# Patient Record
Sex: FEMALE | Race: WHITE | NOT HISPANIC OR LATINO | Employment: OTHER | URBAN - METROPOLITAN AREA
[De-identification: names, ages, dates, MRNs, and addresses within clinical notes are randomized per-mention and may not be internally consistent; named-entity substitution may affect disease eponyms.]

---

## 2017-03-21 ENCOUNTER — APPOINTMENT (OUTPATIENT)
Dept: MAMMOGRAPHY | Facility: CLINIC | Age: 77
End: 2017-03-21

## 2017-03-21 ENCOUNTER — PROCEDURE VISIT (OUTPATIENT)
Dept: OBSTETRICS AND GYNECOLOGY | Facility: CLINIC | Age: 77
End: 2017-03-21

## 2017-03-21 ENCOUNTER — OFFICE VISIT (OUTPATIENT)
Dept: OBSTETRICS AND GYNECOLOGY | Facility: CLINIC | Age: 77
End: 2017-03-21

## 2017-03-21 VITALS
WEIGHT: 136.6 LBS | HEIGHT: 64 IN | SYSTOLIC BLOOD PRESSURE: 110 MMHG | BODY MASS INDEX: 23.32 KG/M2 | DIASTOLIC BLOOD PRESSURE: 60 MMHG

## 2017-03-21 DIAGNOSIS — Z79.890 HORMONE REPLACEMENT THERAPY: ICD-10-CM

## 2017-03-21 DIAGNOSIS — Z13.820 SCREENING FOR OSTEOPOROSIS: Primary | ICD-10-CM

## 2017-03-21 DIAGNOSIS — Z78.0 POSTMENOPAUSAL: ICD-10-CM

## 2017-03-21 DIAGNOSIS — Z12.31 VISIT FOR SCREENING MAMMOGRAM: ICD-10-CM

## 2017-03-21 DIAGNOSIS — Z01.419 WELL WOMAN EXAM WITH ROUTINE GYNECOLOGICAL EXAM: Primary | ICD-10-CM

## 2017-03-21 PROCEDURE — 77080 DXA BONE DENSITY AXIAL: CPT | Performed by: OBSTETRICS & GYNECOLOGY

## 2017-03-21 PROCEDURE — G0101 CA SCREEN;PELVIC/BREAST EXAM: HCPCS | Performed by: OBSTETRICS & GYNECOLOGY

## 2017-03-21 PROCEDURE — G0202 SCR MAMMO BI INCL CAD: HCPCS | Performed by: OBSTETRICS & GYNECOLOGY

## 2017-03-21 RX ORDER — LEVOTHYROXINE SODIUM 0.1 MG/1
TABLET ORAL
Refills: 3 | COMMUNITY
Start: 2017-02-08

## 2017-03-21 RX ORDER — ESTRADIOL 0.1 MG/G
CREAM VAGINAL
Refills: 0 | COMMUNITY
Start: 2017-02-01 | End: 2021-07-15

## 2017-03-21 RX ORDER — ESTRADIOL 1 MG/1
1 TABLET ORAL DAILY
Qty: 90 TABLET | Refills: 3 | Status: SHIPPED | OUTPATIENT
Start: 2017-03-21 | End: 2020-07-09 | Stop reason: SDUPTHER

## 2017-03-21 NOTE — PROGRESS NOTES
Subjective   Lavinia Fatima is a 76 y.o. female is here today as a self referral for annual.    History of Present Illness-here today for annual exam and checkup.   She would also like to restart her estradiol 1 mg because it seemed to help with her knee pain.    The following portions of the patient's history were reviewed and updated as appropriate: allergies, current medications, past family history, past medical history, past social history, past surgical history and problem list.    Review of Systems   Constitutional: Negative.    HENT: Negative.    Eyes: Negative.    Respiratory: Negative.    Cardiovascular: Negative.    Gastrointestinal: Negative.    Endocrine: Negative.    Genitourinary: Negative.    Musculoskeletal: Negative.    Skin: Negative.    Allergic/Immunologic: Negative.    Neurological: Negative.    Hematological: Negative.    Psychiatric/Behavioral: Negative.        Objective   Physical Exam   Constitutional: She is oriented to person, place, and time. She appears well-developed and well-nourished.   HENT:   Head: Normocephalic and atraumatic.   Nose: Nose normal.   Eyes: Conjunctivae and EOM are normal. Pupils are equal, round, and reactive to light.   Neck: Normal range of motion. Neck supple.   Cardiovascular: Normal rate, regular rhythm and normal heart sounds.    Pulmonary/Chest: Effort normal and breath sounds normal. Right breast exhibits no inverted nipple, no mass, no nipple discharge, no skin change and no tenderness. Left breast exhibits no inverted nipple, no mass, no nipple discharge and no skin change. Breasts are symmetrical. There is no breast swelling.   Abdominal: Soft. Hernia confirmed negative in the right inguinal area and confirmed negative in the left inguinal area.   Genitourinary: Rectum normal. No breast tenderness, discharge or bleeding. Pelvic exam was performed with patient supine. No labial fusion. There is no rash, tenderness, lesion or injury on the right labia.  There is no rash, tenderness, lesion or injury on the left labia. Right adnexum displays no mass, no tenderness and no fullness. Left adnexum displays no mass, no tenderness and no fullness. No erythema or bleeding in the vagina. No foreign body in the vagina. No signs of injury around the vagina. No vaginal discharge found.   Neurological: She is alert and oriented to person, place, and time. She has normal reflexes.   Skin: Skin is warm and dry.   Psychiatric: She has a normal mood and affect. Her behavior is normal. Judgment and thought content normal.         Assessment/Plan   Problems Addressed this Visit     None      Visit Diagnoses     Well woman exam with routine gynecological exam    -  Primary    Hormone replacement therapy            Mammogram and DEXA scan done today.  DEXA scan in stable.  Current with colonoscopy.  Prescription sent to pharmacy for estradiol 1 mg daily.

## 2018-03-08 ENCOUNTER — TRANSCRIBE ORDERS (OUTPATIENT)
Dept: ADMINISTRATIVE | Facility: HOSPITAL | Age: 78
End: 2018-03-08

## 2018-03-08 DIAGNOSIS — Z12.31 VISIT FOR SCREENING MAMMOGRAM: Primary | ICD-10-CM

## 2018-03-26 ENCOUNTER — HOSPITAL ENCOUNTER (OUTPATIENT)
Dept: MAMMOGRAPHY | Facility: HOSPITAL | Age: 78
Discharge: HOME | End: 2018-03-26
Admitting: OBSTETRICS & GYNECOLOGY

## 2018-03-26 DIAGNOSIS — Z12.31 VISIT FOR SCREENING MAMMOGRAM: ICD-10-CM

## 2018-03-26 PROCEDURE — 77067 SCR MAMMO BI INCL CAD: CPT

## 2019-05-01 ENCOUNTER — PROCEDURE VISIT (OUTPATIENT)
Dept: OBSTETRICS AND GYNECOLOGY | Facility: CLINIC | Age: 79
End: 2019-05-01

## 2019-05-01 ENCOUNTER — OFFICE VISIT (OUTPATIENT)
Dept: OBSTETRICS AND GYNECOLOGY | Facility: CLINIC | Age: 79
End: 2019-05-01

## 2019-05-01 ENCOUNTER — APPOINTMENT (OUTPATIENT)
Dept: WOMENS IMAGING | Facility: HOSPITAL | Age: 79
End: 2019-05-01

## 2019-05-01 VITALS
WEIGHT: 132 LBS | BODY MASS INDEX: 21.99 KG/M2 | HEIGHT: 65 IN | SYSTOLIC BLOOD PRESSURE: 130 MMHG | DIASTOLIC BLOOD PRESSURE: 80 MMHG

## 2019-05-01 DIAGNOSIS — Z12.31 VISIT FOR SCREENING MAMMOGRAM: Primary | ICD-10-CM

## 2019-05-01 DIAGNOSIS — Z78.0 MENOPAUSE: ICD-10-CM

## 2019-05-01 DIAGNOSIS — Z01.419 ENCOUNTER FOR GYNECOLOGICAL EXAMINATION WITHOUT ABNORMAL FINDING: Primary | ICD-10-CM

## 2019-05-01 DIAGNOSIS — Z78.0 POST-MENOPAUSAL: ICD-10-CM

## 2019-05-01 DIAGNOSIS — Z00.00 PREVENTATIVE HEALTH CARE: Primary | ICD-10-CM

## 2019-05-01 DIAGNOSIS — Z82.62 FAMILY HISTORY OF OSTEOPOROSIS: ICD-10-CM

## 2019-05-01 DIAGNOSIS — N39.46 MIXED STRESS AND URGE URINARY INCONTINENCE: ICD-10-CM

## 2019-05-01 PROCEDURE — 77067 SCR MAMMO BI INCL CAD: CPT | Performed by: OBSTETRICS & GYNECOLOGY

## 2019-05-01 PROCEDURE — G0101 CA SCREEN;PELVIC/BREAST EXAM: HCPCS | Performed by: OBSTETRICS & GYNECOLOGY

## 2019-05-01 PROCEDURE — 77067 SCR MAMMO BI INCL CAD: CPT | Performed by: RADIOLOGY

## 2019-05-01 PROCEDURE — 77080 DXA BONE DENSITY AXIAL: CPT | Performed by: OBSTETRICS & GYNECOLOGY

## 2019-05-01 RX ORDER — METOPROLOL SUCCINATE 50 MG/1
50 TABLET, EXTENDED RELEASE ORAL DAILY
Refills: 3 | COMMUNITY
Start: 2019-04-16

## 2019-05-01 NOTE — PROGRESS NOTES
Subjective    Lavinia Fatima is a 78 y.o. female for annual gyn exam    History of Present Illness   78-year-old postmenopausal white female presents for routine gynecologic exam.  She is status post hysterectomy with bladder repair 3 years ago.  She received a mammogram in the office today.  She has had recurring issues with bladder leakage and tried a pessary following the surgery.  She is seeing Dr. Caldwell the gynecologic urologist.  There is no family history of breast cancer.  Her father had a history of colon cancer.  The patient is current on her screening colonoscopies.  She received a DEXA scan today in the office which was normal.  She has no complaints.    The following portions of the patient's history were reviewed and updated as appropriate: allergies, current medications, past family history, past medical history, past social history, past surgical history and problem list.      Review of Systems   Constitutional: Negative for activity change, appetite change, fatigue and unexpected weight change.   HENT: Negative.    Eyes: Negative.    Respiratory: Negative.    Cardiovascular: Negative.    Gastrointestinal: Negative for abdominal distention, abdominal pain, anal bleeding, constipation, diarrhea, nausea and vomiting.   Endocrine: Negative for cold intolerance, heat intolerance, polydipsia, polyphagia and polyuria.   Genitourinary: Positive for enuresis. Negative for difficulty urinating, dysuria, flank pain, frequency, hematuria, pelvic pain, urgency, vaginal bleeding and vaginal discharge.   Musculoskeletal: Negative.    Skin: Negative.    Allergic/Immunologic: Negative.    Neurological: Negative.    Hematological: Negative.    Psychiatric/Behavioral: Negative.            Physical Exam   Constitutional: She is oriented to person, place, and time. Vital signs are normal. She appears well-developed and well-nourished. She is cooperative.   HENT:   Head: Normocephalic.   Eyes: Conjunctivae are normal.  Pupils are equal, round, and reactive to light.   Neck: Normal range of motion. Neck supple. No tracheal deviation present. No thyromegaly present.   Cardiovascular: Normal rate, regular rhythm and normal heart sounds. Exam reveals no gallop and no friction rub.   No murmur heard.  Pulmonary/Chest: Effort normal and breath sounds normal. No respiratory distress. She has no wheezes. Right breast exhibits no inverted nipple, no mass, no nipple discharge, no skin change and no tenderness. Left breast exhibits no inverted nipple, no mass, no nipple discharge, no skin change and no tenderness. Breasts are symmetrical. There is no breast swelling.   Abdominal: Soft. Bowel sounds are normal. She exhibits no distension, no ascites and no mass. There is no hepatosplenomegaly. There is no tenderness. There is no rebound, no guarding and no CVA tenderness. No hernia. Hernia confirmed negative in the right inguinal area and confirmed negative in the left inguinal area.   Genitourinary: Rectal exam shows no fissure, no mass, no tenderness and anal tone normal. Pelvic exam was performed with patient supine. No labial fusion. There is no rash, tenderness, lesion or injury on the right labia. There is no rash, tenderness, lesion or injury on the left labia. Right adnexum displays no mass, no tenderness and no fullness. Left adnexum displays no mass, no tenderness and no fullness. No erythema, tenderness or bleeding in the vagina. No foreign body in the vagina. No signs of injury around the vagina. No vaginal discharge found.   Musculoskeletal: Normal range of motion. She exhibits no edema or deformity.   Lymphadenopathy:     She has no cervical adenopathy.        Right: No inguinal adenopathy present.        Left: No inguinal adenopathy present.   Neurological: She is alert and oriented to person, place, and time. She has normal reflexes. No cranial nerve deficit. Coordination normal.   Skin: Skin is warm and dry. No rash noted. No  erythema.   Psychiatric: She has a normal mood and affect. Her behavior is normal.         Lavinia was seen today for gynecologic exam.    Diagnoses and all orders for this visit:    Encounter for gynecological examination without abnormal finding  -     Pap IG, Rfx HPV ASCU    Menopause    Mixed stress and urge urinary incontinence    All in all, the patient is doing well.  We discussed her incontinence issues.  I offered a second opinion with a urologist if the patient so desires.  I encouraged continued nutritional supplementation and weightbearing exercise for bone health.  Periodic gynecologic exams were recommended.

## 2019-05-03 LAB
CONV .: NORMAL
CYTOLOGIST CVX/VAG CYTO: NORMAL
CYTOLOGY CVX/VAG DOC THIN PREP: NORMAL
DX ICD CODE: NORMAL
HIV 1 & 2 AB SER-IMP: NORMAL
OTHER STN SPEC: NORMAL
PATH REPORT.FINAL DX SPEC: NORMAL
STAT OF ADQ CVX/VAG CYTO-IMP: NORMAL

## 2020-07-09 RX ORDER — ESTRADIOL 1 MG/1
1 TABLET ORAL DAILY
Qty: 90 TABLET | Refills: 3 | Status: SHIPPED | OUTPATIENT
Start: 2020-07-09 | End: 2020-11-05 | Stop reason: SDUPTHER

## 2020-07-13 ENCOUNTER — APPOINTMENT (OUTPATIENT)
Dept: WOMENS IMAGING | Facility: HOSPITAL | Age: 80
End: 2020-07-13

## 2020-07-13 ENCOUNTER — PROCEDURE VISIT (OUTPATIENT)
Dept: OBSTETRICS AND GYNECOLOGY | Facility: CLINIC | Age: 80
End: 2020-07-13

## 2020-07-13 DIAGNOSIS — Z12.31 VISIT FOR SCREENING MAMMOGRAM: Primary | ICD-10-CM

## 2020-07-13 PROCEDURE — 77067 SCR MAMMO BI INCL CAD: CPT | Performed by: OBSTETRICS & GYNECOLOGY

## 2020-07-13 PROCEDURE — 77067 SCR MAMMO BI INCL CAD: CPT | Performed by: RADIOLOGY

## 2020-07-13 PROCEDURE — 77063 BREAST TOMOSYNTHESIS BI: CPT | Performed by: OBSTETRICS & GYNECOLOGY

## 2020-07-13 PROCEDURE — 77063 BREAST TOMOSYNTHESIS BI: CPT | Performed by: RADIOLOGY

## 2020-11-05 RX ORDER — ESTRADIOL 1 MG/1
1 TABLET ORAL DAILY
Qty: 90 TABLET | Refills: 3 | Status: SHIPPED | OUTPATIENT
Start: 2020-11-05 | End: 2021-07-15

## 2020-11-06 ENCOUNTER — TELEPHONE (OUTPATIENT)
Dept: OBSTETRICS AND GYNECOLOGY | Facility: CLINIC | Age: 80
End: 2020-11-06

## 2020-11-06 NOTE — TELEPHONE ENCOUNTER
----- Message from Saw Nava MD sent at 11/5/2020  4:51 PM EST -----      ----- Message -----  From: Lakesha Anderson  Sent: 11/2/2020   3:49 PM EST  To: Saw Nava MD    REFILL REQUEST ESTRADIOL

## 2021-07-15 ENCOUNTER — APPOINTMENT (OUTPATIENT)
Dept: WOMENS IMAGING | Facility: HOSPITAL | Age: 81
End: 2021-07-15

## 2021-07-15 ENCOUNTER — OFFICE VISIT (OUTPATIENT)
Dept: OBSTETRICS AND GYNECOLOGY | Facility: CLINIC | Age: 81
End: 2021-07-15

## 2021-07-15 ENCOUNTER — PROCEDURE VISIT (OUTPATIENT)
Dept: OBSTETRICS AND GYNECOLOGY | Facility: CLINIC | Age: 81
End: 2021-07-15

## 2021-07-15 VITALS
SYSTOLIC BLOOD PRESSURE: 129 MMHG | WEIGHT: 128 LBS | BODY MASS INDEX: 21.33 KG/M2 | HEIGHT: 65 IN | DIASTOLIC BLOOD PRESSURE: 69 MMHG

## 2021-07-15 DIAGNOSIS — Z01.419 WOMEN'S ANNUAL ROUTINE GYNECOLOGICAL EXAMINATION: Primary | ICD-10-CM

## 2021-07-15 DIAGNOSIS — Z79.890 HORMONE REPLACEMENT THERAPY (HRT): ICD-10-CM

## 2021-07-15 DIAGNOSIS — Z12.31 VISIT FOR SCREENING MAMMOGRAM: Primary | ICD-10-CM

## 2021-07-15 DIAGNOSIS — Z78.0 POSTMENOPAUSE: ICD-10-CM

## 2021-07-15 DIAGNOSIS — R32 URINARY INCONTINENCE, UNSPECIFIED TYPE: ICD-10-CM

## 2021-07-15 PROCEDURE — 77063 BREAST TOMOSYNTHESIS BI: CPT | Performed by: RADIOLOGY

## 2021-07-15 PROCEDURE — 77067 SCR MAMMO BI INCL CAD: CPT | Performed by: RADIOLOGY

## 2021-07-15 PROCEDURE — G0101 CA SCREEN;PELVIC/BREAST EXAM: HCPCS | Performed by: NURSE PRACTITIONER

## 2021-07-15 PROCEDURE — 99213 OFFICE O/P EST LOW 20 MIN: CPT | Performed by: NURSE PRACTITIONER

## 2021-07-15 PROCEDURE — 77067 SCR MAMMO BI INCL CAD: CPT | Performed by: NURSE PRACTITIONER

## 2021-07-15 PROCEDURE — 77063 BREAST TOMOSYNTHESIS BI: CPT | Performed by: NURSE PRACTITIONER

## 2021-07-15 RX ORDER — VITAMIN B COMPLEX
1 CAPSULE ORAL DAILY
COMMUNITY

## 2021-07-15 RX ORDER — IBUPROFEN 800 MG/1
800 TABLET ORAL
COMMUNITY

## 2021-07-15 RX ORDER — CELECOXIB 100 MG/1
100 CAPSULE ORAL 2 TIMES DAILY
COMMUNITY
Start: 2021-05-13

## 2021-07-15 RX ORDER — ESTRADIOL 0.1 MG/G
0.5 CREAM VAGINAL DAILY
Qty: 42.5 G | Refills: 12 | Status: SHIPPED | OUTPATIENT
Start: 2021-07-15 | End: 2022-07-19 | Stop reason: SDUPTHER

## 2021-07-15 RX ORDER — ASPIRIN 81 MG/1
81 TABLET ORAL DAILY
COMMUNITY

## 2021-07-15 NOTE — PROGRESS NOTES
"GYN Annual Exam     Chief Complaint   Patient presents with   • Gynecologic Exam     Annual exam - last ae/pap 2019 negative, MG today       Lavinia Fatima is a 80 y.o. female who presents for annual well woman exam. She is postmenopausal. Prior hysterectomy. She denies vaginal spotting or discharge. She occas SBE monthly. She using HRT, estrace tablets daily, she is unsure of why she is taking this, states she was informed by a previous provider to continue for bone health.    Sees Dr Caldwell, uro-gyn for urinary incontinence. She has also seen Dr Garcia's office, she has not f/u in over one year due to Covid 19 pandemic. Bladder leakage is worsening. She reports continuous bladder leakage. She has previously used a pessary to treat. Reports pessary will \"not stay in\". She was still experiencing leakage around the pessary when using. She was treating with estrace vaginal cream, but has not been using in several months because the Rx ran out. She would like to restart vaginal estrace.     This is my first time meeting Lavinia Fatima  She is a former pt of [facility]    OB History        3    Para   3    Term   3            AB        Living           SAB        TAB        Ectopic        Molar        Multiple        Live Births                    Mammogram: today  Dexa scan: 2019 -normal, hx of osteopenia.   Colonoscopy: 7 years ago  Last Pap : 2019-negative  History of abnormal Pap smear: no  Family history of uterine, colon or ovarian cancer: yes - father, colon  Family history of breast cancer: no  History of abnormal mammogram: no    Menopause:  Bleeding since? no  Vasomotor symptoms: no  HRT: yes  Incontinence?  yes        Past Medical History:   Diagnosis Date   • Disease of thyroid gland        Past Surgical History:   Procedure Laterality Date   • BLADDER REPAIR     • BREAST BIOPSY     • HYSTERECTOMY           Current Outpatient Medications:   •  ascorbic acid (VITAMIN C) 500 MG capsule " controlled-release CR capsule, Take 1 capsule by mouth Daily., Disp: , Rfl:   •  aspirin (aspirin) 81 MG EC tablet, Take 81 mg by mouth Daily., Disp: , Rfl:   •  B Complex Vitamins (vitamin b complex) capsule capsule, Take 1 capsule by mouth Daily., Disp: , Rfl:   •  celecoxib (CeleBREX) 100 MG capsule, Take 100 mg by mouth 2 (Two) Times a Day., Disp: , Rfl:   •  estradiol (ESTRACE) 1 MG tablet, Take 1 tablet by mouth Daily., Disp: 90 tablet, Rfl: 3  •  ibuprofen (ADVIL,MOTRIN) 800 MG tablet, Take 800 mg by mouth., Disp: , Rfl:   •  levothyroxine (SYNTHROID, LEVOTHROID) 100 MCG tablet, TK 1 T PO  QD, Disp: , Rfl: 3  •  metoprolol succinate XL (TOPROL-XL) 50 MG 24 hr tablet, Take 50 mg by mouth Daily., Disp: , Rfl: 3  •  nystatin (MYCOSTATIN) 807746 UNIT/ML suspension, SWISH AND HOLD 5 ML IN MOUTH FOR 1 MINUTE BEFORE SWALLOWING FOUR TIMES DAILY. NOTHING TO EAT/DRINK WITHIN 30 MINUTES OF DOSE, Disp: , Rfl:   •  nystatin-triamcinolone (MYCOLOG II) 269575-7.1 UNIT/GM-% cream, APPLY TO CORNERS OF MOUTH AFTER MEALS AND AT BEDTIME. DO NOT SWALLOW, Disp: , Rfl:     Allergies   Allergen Reactions   • Cefdinir Diarrhea     Bloody diarrhea; patient thinks its ok to use Kefzol.   • Codeine Nausea And Vomiting   • Sulfa Antibiotics Nausea Only       Social History     Tobacco Use   • Smoking status: Never Smoker   Substance Use Topics   • Alcohol use: Not on file   • Drug use: Not on file       Family History   Problem Relation Age of Onset   • Colon cancer Father    • Prostate cancer Father    • Heart disease Mother        Review of Systems   Constitutional: Negative for chills, fatigue and fever.   Gastrointestinal: Negative for abdominal distention, abdominal pain, nausea and vomiting.   Endocrine: Negative for cold intolerance and heat intolerance.   Genitourinary: Negative for dysuria, pelvic pain, vaginal bleeding, vaginal discharge and vaginal pain.        + bladder prolapse  + incontinence     Musculoskeletal: Negative  "for gait problem.   Skin: Negative for rash.       /69   Ht 165.1 cm (65\")   Wt 58.1 kg (128 lb)   BMI 21.30 kg/m²     Physical Exam  Constitutional:       Appearance: Normal appearance.   Genitourinary:      Pelvic exam was performed with patient in the lithotomy position.      Vulva, inguinal canal, urethra and vagina normal.      Cervix is absent.      Uterus is absent.      Right adnexa absent.      Left adnexa absent.      Genitourinary Comments: Moderate VVA noted  Cystocele noted   Rectum:      No external hemorrhoid.   HENT:      Head: Normocephalic and atraumatic.   Eyes:      Pupils: Pupils are equal, round, and reactive to light.   Cardiovascular:      Rate and Rhythm: Normal rate.   Pulmonary:      Effort: Pulmonary effort is normal.   Chest:      Breasts: Breasts are symmetrical.         Right: Normal.         Left: Normal.   Abdominal:      General: There is no distension.      Palpations: Abdomen is soft. There is no mass.      Tenderness: There is no abdominal tenderness. There is no guarding.      Hernia: No hernia is present.   Musculoskeletal:         General: Normal range of motion.      Cervical back: Normal range of motion and neck supple. No tenderness.   Lymphadenopathy:      Cervical: No cervical adenopathy.      Upper Body:      Right upper body: No supraclavicular, axillary or pectoral adenopathy.      Left upper body: No supraclavicular, axillary or pectoral adenopathy.   Neurological:      General: No focal deficit present.      Mental Status: She is alert and oriented to person, place, and time.      Cranial Nerves: No cranial nerve deficit.   Skin:     General: Skin is warm and dry.   Psychiatric:         Mood and Affect: Mood normal.         Behavior: Behavior normal.         Thought Content: Thought content normal.         Judgment: Judgment normal.   Vitals and nursing note reviewed.       Assessment     1) GYN annual well woman exam.   2) Postmenopausal   3) Urinary " incontinence   4) HRT     Plan     1) Breast Health - Clinical breast exam & mammogram yearly, Self breast awareness. Schedule screening mammogram.   2) Pap - not indicated  4) Smoking status- nonsmoker  5) Colon health - screening colonoscopy recommended if not up to date  6) Patient's Body mass index is 21.3 kg/m². indicating that she is within normal range (BMI 18.5-24.9). No BMI management plan needed..  7) Bone health - Weight bearing exercise, dietary calcium recommendations and vitamin D  reviewed.   8) Encouraged 150 minutes of exercise per week if not medically contraindicated  9) Patient counseled that hormone treatment should be used to help with specific symptoms related to menopause such as hot flashes, night sweats and vaginal atrophy.  Hormones should not be given for “general wellbeing” or to avoid aging. The lowest dose hormone that treats symptoms should be used for the shortest about of time possible.  Although estrogen is the most effective treatment for hot flashes, other non hormonal options exist (such as Brisdelle or venlafaxine) and should also be considered.  Anyone with an intact uterus should also receive progestogen to prevent uterine overgrowth that can lead to uterine cancer.  All hormone therapy, whether it is synthetic or bio identical, can lead to increased risk of stroke, heart attack and thromboembolic diseases.  These adverse events are more likely to develop in the first year of use and in patients who are older than the typical menopausal age.  Risks of estrogen dependent cancers such as breast cancer increase with prolonged use.  Attempts to wean hormone therapy should be discussed annually and particularly after three to five years of use.  Any side effects such as vaginal bleeding or pain should be reported immediately. She plans to discontinue estradiol tablets. She will call is she has issues with hot flashes or night sweats  10. Estrace vaginal cream refilled. Recommend  pt f/u with uro-gyn for incontinence issues given failed pessary and failed bladder repair surgery. She requests a referral to a new office, she requests to see Dr Thrasher with [facility]    Follow up prn and one year    Shannon Turner, APRN  7/15/2021  12:13 EDT

## 2022-07-19 ENCOUNTER — PROCEDURE VISIT (OUTPATIENT)
Dept: OBSTETRICS AND GYNECOLOGY | Facility: CLINIC | Age: 82
End: 2022-07-19

## 2022-07-19 ENCOUNTER — OFFICE VISIT (OUTPATIENT)
Dept: OBSTETRICS AND GYNECOLOGY | Facility: CLINIC | Age: 82
End: 2022-07-19

## 2022-07-19 ENCOUNTER — APPOINTMENT (OUTPATIENT)
Dept: WOMENS IMAGING | Facility: HOSPITAL | Age: 82
End: 2022-07-19

## 2022-07-19 VITALS
HEIGHT: 63 IN | WEIGHT: 125 LBS | SYSTOLIC BLOOD PRESSURE: 122 MMHG | DIASTOLIC BLOOD PRESSURE: 63 MMHG | BODY MASS INDEX: 22.15 KG/M2

## 2022-07-19 DIAGNOSIS — N39.46 URINARY INCONTINENCE, MIXED: ICD-10-CM

## 2022-07-19 DIAGNOSIS — Z78.0 POSTMENOPAUSE: ICD-10-CM

## 2022-07-19 DIAGNOSIS — N89.8 VAGINAL DRYNESS: Primary | ICD-10-CM

## 2022-07-19 DIAGNOSIS — Z12.31 VISIT FOR SCREENING MAMMOGRAM: Primary | ICD-10-CM

## 2022-07-19 PROCEDURE — 77067 SCR MAMMO BI INCL CAD: CPT | Performed by: RADIOLOGY

## 2022-07-19 PROCEDURE — 77063 BREAST TOMOSYNTHESIS BI: CPT | Performed by: NURSE PRACTITIONER

## 2022-07-19 PROCEDURE — 77067 SCR MAMMO BI INCL CAD: CPT | Performed by: NURSE PRACTITIONER

## 2022-07-19 PROCEDURE — 77063 BREAST TOMOSYNTHESIS BI: CPT | Performed by: RADIOLOGY

## 2022-07-19 PROCEDURE — 99213 OFFICE O/P EST LOW 20 MIN: CPT | Performed by: NURSE PRACTITIONER

## 2022-07-19 RX ORDER — GABAPENTIN 300 MG/1
300 CAPSULE ORAL 3 TIMES DAILY
COMMUNITY
Start: 2022-06-14

## 2022-07-19 RX ORDER — ESTRADIOL 0.1 MG/G
0.5 CREAM VAGINAL 2 TIMES WEEKLY
Qty: 42.5 G | Refills: 12 | Status: SHIPPED | OUTPATIENT
Start: 2022-07-21 | End: 2023-02-02 | Stop reason: SDUPTHER

## 2022-07-19 RX ORDER — CITALOPRAM 10 MG/1
10 TABLET ORAL DAILY
COMMUNITY
Start: 2022-06-14

## 2022-07-19 NOTE — PROGRESS NOTES
"Chief Complaint   Patient presents with   • Follow-up     Discuss dexa and estrace vaginal cream        SUBJECTIVE:     Lavinia Fatima is a 81 y.o.  who presents for f/u to discuss dexa scan and she would like a refill of vaginal estrace cream.  Vaginal dryness is improved with estrace. She has topped taking oral HRT and done well with this, denies hot flashes or night sweats. She is postmenopausal. Last dexa scan 2019 normal. She would like to repeat this year. Mammogram screening completed today. Hx mixed urinary incontinence. She was seen by uro/gyn in the past. At her last visit we discussed follow up for pessary insertion. She states her symptoms are well controlled at this time with timed voids. She states she rarely is having incontinence issues.      Past Medical History:   Diagnosis Date   • Disease of thyroid gland       Past Surgical History:   Procedure Laterality Date   • BLADDER REPAIR     • BREAST BIOPSY     • HYSTERECTOMY        Social History     Tobacco Use   • Smoking status: Never Smoker     OB History    Para Term  AB Living   3 3 3         SAB IAB Ectopic Molar Multiple Live Births                    # Outcome Date GA Lbr Scott/2nd Weight Sex Delivery Anes PTL Lv   3 Term            2 Term            1 Term                 Review of Systems   Constitutional: Negative for chills, fatigue and fever.   Gastrointestinal: Negative for abdominal distention and abdominal pain.   Endocrine: Negative for cold intolerance and heat intolerance.   Genitourinary: Negative for dysuria, frequency, pelvic pain, urgency, vaginal bleeding, vaginal discharge and vaginal pain.       OBJECTIVE:   Vitals:    22 0913   BP: 122/63   Weight: 56.7 kg (125 lb)   Height: 160 cm (63\")        Physical Exam  Constitutional:       General: She is not in acute distress.     Appearance: Normal appearance. She is not ill-appearing, toxic-appearing or diaphoretic.   Cardiovascular:      Rate and Rhythm: " Normal rate.   Pulmonary:      Effort: Pulmonary effort is normal.   Musculoskeletal:         General: Normal range of motion.      Cervical back: Normal range of motion.   Neurological:      General: No focal deficit present.      Mental Status: She is alert and oriented to person, place, and time.      Cranial Nerves: No cranial nerve deficit.   Skin:     General: Skin is warm and dry.   Psychiatric:         Mood and Affect: Mood normal.         Behavior: Behavior normal.         Thought Content: Thought content normal.         Judgment: Judgment normal.   Vitals and nursing note reviewed.         Assessment/Plan    Diagnoses and all orders for this visit:    1. Vaginal dryness (Primary)  -     estradiol (ESTRACE VAGINAL) 0.1 MG/GM vaginal cream; Insert 0.5 g into the vagina 2 (Two) Times a Week.  Dispense: 42.5 g; Refill: 12    2. Postmenopause  -     DEXA Bone Density Axial; Future    3. Urinary incontinence, mixed    Vaginal estrace refilled  Dexa scan ordered  Urinary incontinence is well controlled at this time with timed voids  Mammogram completed today    Follow up: PRN and annually     I spent 20 minutes caring for Lavinia on this date of service. This time includes time spent by me in the following activities: reviewing tests, obtaining and/or reviewing a separately obtained history, performing a medically appropriate examination and/or evaluation, counseling and educating the patient/family/caregiver, ordering medications, tests, or procedures, referring and communicating with other health care professionals and documenting information in the medical record    Shannon Turner, APRN  7/19/2022  09:50 EDT

## 2022-07-26 ENCOUNTER — TELEPHONE (OUTPATIENT)
Dept: OBSTETRICS AND GYNECOLOGY | Facility: CLINIC | Age: 82
End: 2022-07-26

## 2022-07-26 NOTE — PROGRESS NOTES
Please let the pt know that her recent screening mammogram shows asymmetric tissue that is benign/negative. Thank you

## 2022-07-26 NOTE — TELEPHONE ENCOUNTER
Spoke with Lavinia to let her know that BRETT Swartz wanted to let her know that her recent Mx shows asymmetric tissue that is benign/negative. Thank you.

## 2022-07-26 NOTE — TELEPHONE ENCOUNTER
----- Message from BRETT Rae sent at 7/26/2022 11:49 AM EDT -----  Please let the pt know that her recent screening mammogram shows asymmetric tissue that is benign/negative. Thank you

## 2022-12-27 ENCOUNTER — HOSPITAL ENCOUNTER (OUTPATIENT)
Dept: BONE DENSITY | Facility: HOSPITAL | Age: 82
Discharge: HOME | End: 2022-12-27
Admitting: NURSE PRACTITIONER

## 2022-12-27 DIAGNOSIS — Z78.0 POSTMENOPAUSE: ICD-10-CM

## 2022-12-27 PROCEDURE — 77080 DXA BONE DENSITY AXIAL: CPT

## 2023-02-02 DIAGNOSIS — N89.8 VAGINAL DRYNESS: ICD-10-CM

## 2023-02-02 RX ORDER — ESTRADIOL 0.1 MG/G
0.5 CREAM VAGINAL DAILY
Qty: 42.5 G | Refills: 2 | Status: SHIPPED | OUTPATIENT
Start: 2023-02-02

## 2024-03-27 DIAGNOSIS — N89.8 VAGINAL DRYNESS: ICD-10-CM

## 2024-03-27 RX ORDER — ESTRADIOL 0.1 MG/G
CREAM VAGINAL
Qty: 42.5 G | Refills: 1 | Status: SHIPPED | OUTPATIENT
Start: 2024-03-27

## 2024-06-14 ENCOUNTER — TELEPHONE (OUTPATIENT)
Dept: OBSTETRICS AND GYNECOLOGY | Facility: CLINIC | Age: 84
End: 2024-06-14
Payer: MEDICARE

## 2024-06-17 NOTE — TELEPHONE ENCOUNTER
Johnny pt. 7/21/23 Mx, Dexa 12/27/22, 7/19/22 Postmenopause. Requesting Dexa orders so she can schedule in [place]. Thank you

## 2024-06-18 ENCOUNTER — TELEPHONE (OUTPATIENT)
Dept: OBSTETRICS AND GYNECOLOGY | Facility: CLINIC | Age: 84
End: 2024-06-18
Payer: MEDICARE

## 2024-06-18 DIAGNOSIS — Z78.0 POST-MENOPAUSAL: Primary | ICD-10-CM

## 2024-06-24 NOTE — TELEPHONE ENCOUNTER
Left message to confirm patient has been notified of her Dexa being scheduled. If she has, no need to call back.

## 2024-07-30 NOTE — PROGRESS NOTES
GYN ANNUAL EXAM     Chief Complaint   Patient presents with    Annual Exam     AE and last pap ,MX today       HPI    Lavinia is a 83 y.o. female who presents for annual well woman exam. She is a patient of Veronica.     OB History          3    Para   3    Term   3            AB        Living             SAB        IAB        Ectopic        Molar        Multiple        Live Births                    SUBJECTIVE    MENSTRUAL & SEXUAL HEALTH  LMP: No LMP recorded. Patient has had a hysterectomy.  Last pap: 2019, Normal PAP  History of abnormal pap: no  History of STD: No  Family history of cancer: colon cancer       Family history of breast cancer: no  Performs SBE: performs monthly.  Mammogram: , Birads I (Normal).  History of abnormal mammogram: yes - with benign breast biopsy  Colonoscopy: n/a  Bleeding since LMP: no  Vasomotor symptoms: no  HRT: no  Incontinence: no  Dyspareunia: no  DEXA scan: , Normal    Past Medical History:   Diagnosis Date    Disease of thyroid gland        Past Surgical History:   Procedure Laterality Date    BLADDER REPAIR      BREAST BIOPSY      HYSTERECTOMY           Current Outpatient Medications:     ascorbic acid (VITAMIN C) 500 MG capsule controlled-release CR capsule, Take 1 capsule by mouth Daily., Disp: , Rfl:     aspirin 81 MG EC tablet, Take 1 tablet by mouth Daily., Disp: , Rfl:     B Complex Vitamins (vitamin b complex) capsule capsule, Take 1 capsule by mouth Daily., Disp: , Rfl:     estradiol (ESTRACE) 0.1 MG/GM vaginal cream, INSERT 0.5 GRAM VAGINALLY DAILY, Disp: 42.5 g, Rfl: 1    gabapentin (NEURONTIN) 300 MG capsule, Take 1 capsule by mouth 3 (Three) Times a Day., Disp: , Rfl:     ibuprofen (ADVIL,MOTRIN) 800 MG tablet, Take 1 tablet by mouth., Disp: , Rfl:     levothyroxine (SYNTHROID, LEVOTHROID) 100 MCG tablet, TK 1 T PO  QD, Disp: , Rfl: 3    metoprolol succinate XL (TOPROL-XL) 50 MG 24 hr tablet, Take 1 tablet by mouth Daily., Disp: , Rfl:  "3    celecoxib (CeleBREX) 100 MG capsule, Take 100 mg by mouth 2 (Two) Times a Day., Disp: , Rfl:     citalopram (CeleXA) 10 MG tablet, Take 10 mg by mouth Daily., Disp: , Rfl:     nystatin (MYCOSTATIN) 317601 UNIT/ML suspension, SWISH AND HOLD 5 ML IN MOUTH FOR 1 MINUTE BEFORE SWALLOWING FOUR TIMES DAILY. NOTHING TO EAT/DRINK WITHIN 30 MINUTES OF DOSE, Disp: , Rfl:     nystatin-triamcinolone (MYCOLOG II) 755849-4.1 UNIT/GM-% cream, APPLY TO CORNERS OF MOUTH AFTER MEALS AND AT BEDTIME. DO NOT SWALLOW, Disp: , Rfl:     Allergies   Allergen Reactions    Cefdinir Diarrhea     Bloody diarrhea; patient thinks its ok to use Kefzol.    Codeine Nausea And Vomiting    Sulfa Antibiotics Nausea Only       Social History     Tobacco Use    Smoking status: Never       Family History   Problem Relation Age of Onset    Colon cancer Father     Prostate cancer Father     Heart disease Mother        Review of Systems   Constitutional:  Negative for fatigue, unexpected weight gain and unexpected weight loss.   Gastrointestinal:  Negative for abdominal pain.   Genitourinary:  Negative for decreased libido, difficulty urinating, dyspareunia, dysuria, pelvic pain, pelvic pressure, urgency, urinary incontinence and vaginal discharge.   All other systems reviewed and are negative.      OBJECTIVE    /76   Ht 160 cm (62.99\")   Wt 58.1 kg (128 lb)   BMI 22.68 kg/m²     Physical Exam  Constitutional:       General: She is awake. She is not in acute distress.     Appearance: Normal appearance. She is well-developed, well-groomed and normal weight. She is not ill-appearing.   Genitourinary:      Vulva, bladder and urethral meatus normal.      No lesions in the vagina.      Right Labia: No rash, tenderness, lesions or skin changes.     Left Labia: No tenderness, lesions, skin changes or rash.     No labial fusion noted.      No inguinal adenopathy present in the right or left side.     Vaginal cuff intact.     No vaginal discharge, " erythema, tenderness, bleeding, ulceration, granulation tissue or cuff induration.      No vaginal prolapse present.     No vaginal atrophy present.     Cervix is absent.      Uterus is absent.      No urethral prolapse or discharge present.      Pelvic exam was performed with patient in the lithotomy position.   Breasts:     Breasts are symmetrical.      Breasts are soft.     Right: Normal. No inverted nipple, mass, nipple discharge, skin change or tenderness.      Left: Normal. No inverted nipple, mass, nipple discharge, skin change or tenderness.   HENT:      Head: Normocephalic and atraumatic.   Eyes:      General: No scleral icterus.     Conjunctiva/sclera: Conjunctivae normal.   Cardiovascular:      Rate and Rhythm: Normal rate and regular rhythm.      Heart sounds: Normal heart sounds.   Pulmonary:      Effort: Pulmonary effort is normal.      Breath sounds: Normal breath sounds.   Abdominal:      Palpations: Abdomen is soft.      Tenderness: There is no abdominal tenderness. There is no guarding or rebound.   Musculoskeletal:         General: Normal range of motion.      Cervical back: Normal range of motion.   Lymphadenopathy:      Upper Body:      Right upper body: No axillary adenopathy.      Left upper body: No axillary adenopathy.      Lower Body: No right inguinal adenopathy. No left inguinal adenopathy.   Neurological:      Mental Status: She is alert and oriented to person, place, and time.   Skin:     General: Skin is warm and dry.   Psychiatric:         Behavior: Behavior normal. Behavior is cooperative.   Vitals reviewed.       ASSESSMENT    Diagnoses and all orders for this visit:    1. Encounter for gynecological examination without abnormal finding (Primary)  -     IGP, Apt HPV,rfx 16 / 18,45    2. Menopausal state  -     IGP, Apt HPV,rfx 16 / 18,45    3. Screening for malignant neoplasm of colon  -     Cologuard - Stool, Per Rectum; Future         PLAN   WELL WOMAN EXAM: Pap smear collected  today. Recommend MVI daily.    POST MENOPAUSAL STATUS: post menopausal status - discussed importance of returning to care if she experiences PMB.   SMOKING STATUS: non smoker.  BONE HEALTH: weight bearing exercise, dietary calcium recommendations and vitamin D reviewed. DEXA scan if not already performed.   COLON HEALTH: screening colonoscopy or Cologuard recommended.  BREAST HEALTH: Encouraged annual mammogram screening starting at age 40. Instructed on how to perform SBE. Encouraged breast health self awareness.  EXERCISE: Encouraged 150 minutes of exercise per week if not medially contraindicated.   BMI: Body mass index is 22.68 kg/m².     Return in about 1 year (around 8/7/2025) for annual exam or as needed before.    I spent 15 minutes caring for Lavinia on this date of service. This time includes time spent by me in the following activities: preparing for the visit, reviewing tests, performing a medically appropriate examination and/or evaluation, counseling and educating the patient/family/caregiver, referring and communicating with other health care professionals, documenting information in the medical record, independently interpreting results and communicating that information with the patient/family/caregiver, care coordination, ordering medications, ordering test(s), ordering procedure(s), obtaining a separately obtained history, and reviewing a separately obtained history.    Angela Hill CNM  8/7/2024  16:17 EDT

## 2024-08-07 ENCOUNTER — PROCEDURE VISIT (OUTPATIENT)
Dept: OBSTETRICS AND GYNECOLOGY | Facility: CLINIC | Age: 84
End: 2024-08-07
Payer: MEDICARE

## 2024-08-07 ENCOUNTER — OFFICE VISIT (OUTPATIENT)
Dept: OBSTETRICS AND GYNECOLOGY | Facility: CLINIC | Age: 84
End: 2024-08-07
Payer: MEDICARE

## 2024-08-07 VITALS
WEIGHT: 128 LBS | BODY MASS INDEX: 22.68 KG/M2 | SYSTOLIC BLOOD PRESSURE: 117 MMHG | DIASTOLIC BLOOD PRESSURE: 76 MMHG | HEIGHT: 63 IN

## 2024-08-07 DIAGNOSIS — Z01.419 ENCOUNTER FOR GYNECOLOGICAL EXAMINATION WITHOUT ABNORMAL FINDING: Primary | ICD-10-CM

## 2024-08-07 DIAGNOSIS — N95.1 MENOPAUSAL STATE: ICD-10-CM

## 2024-08-07 DIAGNOSIS — Z12.11 SCREENING FOR MALIGNANT NEOPLASM OF COLON: ICD-10-CM

## 2024-08-07 DIAGNOSIS — Z12.31 VISIT FOR SCREENING MAMMOGRAM: Primary | ICD-10-CM

## 2024-08-07 PROCEDURE — 3015F CERV CANCER SCREEN DOCD: CPT

## 2024-08-07 PROCEDURE — 1160F RVW MEDS BY RX/DR IN RCRD: CPT

## 2024-08-07 PROCEDURE — 1159F MED LIST DOCD IN RCRD: CPT

## 2024-08-12 RX ORDER — ESTRADIOL 0.1 MG/G
2 CREAM VAGINAL DAILY
Qty: 42.5 G | Refills: 5 | Status: SHIPPED | OUTPATIENT
Start: 2024-08-12

## 2024-08-13 LAB
CYTOLOGIST CVX/VAG CYTO: NORMAL
CYTOLOGY CVX/VAG DOC CYTO: NORMAL
CYTOLOGY CVX/VAG DOC THIN PREP: NORMAL
DX ICD CODE: NORMAL
HPV I/H RISK 4 DNA CVX QL PROBE+SIG AMP: NEGATIVE
Lab: NORMAL
OTHER STN SPEC: NORMAL
STAT OF ADQ CVX/VAG CYTO-IMP: NORMAL

## 2024-12-30 ENCOUNTER — HOSPITAL ENCOUNTER (OUTPATIENT)
Dept: BONE DENSITY | Facility: HOSPITAL | Age: 84
Discharge: HOME | End: 2024-12-30
Admitting: NURSE PRACTITIONER
Payer: MEDICARE

## 2024-12-30 DIAGNOSIS — Z78.0 POST-MENOPAUSAL: ICD-10-CM

## 2024-12-30 PROCEDURE — 77080 DXA BONE DENSITY AXIAL: CPT

## 2025-08-12 ENCOUNTER — PROCEDURE VISIT (OUTPATIENT)
Dept: OBSTETRICS AND GYNECOLOGY | Facility: CLINIC | Age: 85
End: 2025-08-12
Payer: MEDICARE

## 2025-08-12 DIAGNOSIS — Z12.31 VISIT FOR SCREENING MAMMOGRAM: Primary | ICD-10-CM

## 2025-08-12 PROCEDURE — 77067 SCR MAMMO BI INCL CAD: CPT

## 2025-08-12 PROCEDURE — 77063 BREAST TOMOSYNTHESIS BI: CPT
